# Patient Record
Sex: FEMALE
[De-identification: names, ages, dates, MRNs, and addresses within clinical notes are randomized per-mention and may not be internally consistent; named-entity substitution may affect disease eponyms.]

---

## 2020-09-08 PROBLEM — Z00.00 ENCOUNTER FOR PREVENTIVE HEALTH EXAMINATION: Status: ACTIVE | Noted: 2020-09-08

## 2020-09-09 ENCOUNTER — APPOINTMENT (OUTPATIENT)
Dept: ORTHOPEDIC SURGERY | Facility: CLINIC | Age: 65
End: 2020-09-09
Payer: MEDICARE

## 2020-09-09 VITALS
SYSTOLIC BLOOD PRESSURE: 127 MMHG | BODY MASS INDEX: 21.26 KG/M2 | HEIGHT: 63 IN | WEIGHT: 120 LBS | DIASTOLIC BLOOD PRESSURE: 82 MMHG

## 2020-09-09 VITALS — TEMPERATURE: 97.3 F

## 2020-09-09 DIAGNOSIS — Q74.0 OTHER CONGENITAL MALFORMATIONS OF UPPER LIMB(S), INCLUDING SHOULDER GIRDLE: ICD-10-CM

## 2020-09-09 DIAGNOSIS — M25.332 OTHER INSTABILITY, LEFT WRIST: ICD-10-CM

## 2020-09-09 DIAGNOSIS — M25.531 PAIN IN RIGHT WRIST: ICD-10-CM

## 2020-09-09 DIAGNOSIS — Z78.9 OTHER SPECIFIED HEALTH STATUS: ICD-10-CM

## 2020-09-09 DIAGNOSIS — M25.532 PAIN IN LEFT WRIST: ICD-10-CM

## 2020-09-09 DIAGNOSIS — M19.131 POST-TRAUMATIC OSTEOARTHRITIS, RIGHT WRIST: ICD-10-CM

## 2020-09-09 DIAGNOSIS — J30.2 OTHER SEASONAL ALLERGIC RHINITIS: ICD-10-CM

## 2020-09-09 PROCEDURE — 73110 X-RAY EXAM OF WRIST: CPT | Mod: RT

## 2020-09-09 PROCEDURE — 99203 OFFICE O/P NEW LOW 30 MIN: CPT

## 2020-09-12 PROBLEM — Z78.9 CURRENT NON-SMOKER: Status: ACTIVE | Noted: 2020-09-12

## 2020-09-12 PROBLEM — Z78.9 CONSUMES ALCOHOL OCCASIONALLY: Status: ACTIVE | Noted: 2020-09-12

## 2020-09-12 RX ORDER — ALPRAZOLAM 0.5 MG/1
0.5 TABLET ORAL
Qty: 120 | Refills: 0 | Status: ACTIVE | COMMUNITY
Start: 2020-04-28

## 2024-01-01 ENCOUNTER — EMERGENCY (EMERGENCY)
Facility: HOSPITAL | Age: 69
LOS: 1 days | End: 2024-01-01
Attending: STUDENT IN AN ORGANIZED HEALTH CARE EDUCATION/TRAINING PROGRAM | Admitting: EMERGENCY MEDICINE
Payer: MEDICARE

## 2024-01-01 VITALS
SYSTOLIC BLOOD PRESSURE: 135 MMHG | DIASTOLIC BLOOD PRESSURE: 79 MMHG | OXYGEN SATURATION: 98 % | TEMPERATURE: 98 F | RESPIRATION RATE: 16 BRPM | HEART RATE: 67 BPM

## 2024-01-01 VITALS
OXYGEN SATURATION: 95 % | WEIGHT: 119.93 LBS | RESPIRATION RATE: 18 BRPM | HEART RATE: 102 BPM | SYSTOLIC BLOOD PRESSURE: 173 MMHG | TEMPERATURE: 98 F | HEIGHT: 63 IN | DIASTOLIC BLOOD PRESSURE: 95 MMHG

## 2024-01-01 DIAGNOSIS — I67.1 CEREBRAL ANEURYSM, NONRUPTURED: ICD-10-CM

## 2024-01-01 DIAGNOSIS — Z90.13 ACQUIRED ABSENCE OF BILATERAL BREASTS AND NIPPLES: Chronic | ICD-10-CM

## 2024-01-01 LAB
ADD ON TEST-SPECIMEN IN LAB: SIGNIFICANT CHANGE UP
ALBUMIN SERPL ELPH-MCNC: 4.1 G/DL — SIGNIFICANT CHANGE UP (ref 3.3–5)
ALBUMIN SERPL ELPH-MCNC: 4.4 G/DL — SIGNIFICANT CHANGE UP (ref 3.3–5)
ALP SERPL-CCNC: 102 U/L — SIGNIFICANT CHANGE UP (ref 40–120)
ALP SERPL-CCNC: 110 U/L — SIGNIFICANT CHANGE UP (ref 40–120)
ALT FLD-CCNC: 25 U/L — SIGNIFICANT CHANGE UP (ref 4–33)
ALT FLD-CCNC: 27 U/L — SIGNIFICANT CHANGE UP (ref 4–33)
ANION GAP SERPL CALC-SCNC: 15 MMOL/L — HIGH (ref 7–14)
ANION GAP SERPL CALC-SCNC: 17 MMOL/L — HIGH (ref 7–14)
APTT BLD: 28.7 SEC — SIGNIFICANT CHANGE UP (ref 24.5–35.6)
AST SERPL-CCNC: 15 U/L — SIGNIFICANT CHANGE UP (ref 4–32)
AST SERPL-CCNC: 21 U/L — SIGNIFICANT CHANGE UP (ref 4–32)
BASOPHILS # BLD AUTO: 0.02 K/UL — SIGNIFICANT CHANGE UP (ref 0–0.2)
BASOPHILS # BLD AUTO: 0.03 K/UL — SIGNIFICANT CHANGE UP (ref 0–0.2)
BASOPHILS NFR BLD AUTO: 0.2 % — SIGNIFICANT CHANGE UP (ref 0–2)
BASOPHILS NFR BLD AUTO: 0.3 % — SIGNIFICANT CHANGE UP (ref 0–2)
BILIRUB SERPL-MCNC: 0.6 MG/DL — SIGNIFICANT CHANGE UP (ref 0.2–1.2)
BILIRUB SERPL-MCNC: 0.7 MG/DL — SIGNIFICANT CHANGE UP (ref 0.2–1.2)
BLD GP AB SCN SERPL QL: NEGATIVE — SIGNIFICANT CHANGE UP
BUN SERPL-MCNC: 11 MG/DL — SIGNIFICANT CHANGE UP (ref 7–23)
BUN SERPL-MCNC: 13 MG/DL — SIGNIFICANT CHANGE UP (ref 7–23)
CALCIUM SERPL-MCNC: 9.1 MG/DL — SIGNIFICANT CHANGE UP (ref 8.4–10.5)
CALCIUM SERPL-MCNC: 9.7 MG/DL — SIGNIFICANT CHANGE UP (ref 8.4–10.5)
CHLORIDE SERPL-SCNC: 102 MMOL/L — SIGNIFICANT CHANGE UP (ref 98–107)
CHLORIDE SERPL-SCNC: 105 MMOL/L — SIGNIFICANT CHANGE UP (ref 98–107)
CO2 SERPL-SCNC: 20 MMOL/L — LOW (ref 22–31)
CO2 SERPL-SCNC: 23 MMOL/L — SIGNIFICANT CHANGE UP (ref 22–31)
CREAT SERPL-MCNC: 0.5 MG/DL — SIGNIFICANT CHANGE UP (ref 0.5–1.3)
CREAT SERPL-MCNC: 0.57 MG/DL — SIGNIFICANT CHANGE UP (ref 0.5–1.3)
EGFR: 101 ML/MIN/1.73M2 — SIGNIFICANT CHANGE UP
EGFR: 98 ML/MIN/1.73M2 — SIGNIFICANT CHANGE UP
EOSINOPHIL # BLD AUTO: 0.01 K/UL — SIGNIFICANT CHANGE UP (ref 0–0.5)
EOSINOPHIL # BLD AUTO: 0.03 K/UL — SIGNIFICANT CHANGE UP (ref 0–0.5)
EOSINOPHIL NFR BLD AUTO: 0.1 % — SIGNIFICANT CHANGE UP (ref 0–6)
EOSINOPHIL NFR BLD AUTO: 0.3 % — SIGNIFICANT CHANGE UP (ref 0–6)
FLUAV AG NPH QL: SIGNIFICANT CHANGE UP
FLUBV AG NPH QL: SIGNIFICANT CHANGE UP
GLUCOSE SERPL-MCNC: 107 MG/DL — HIGH (ref 70–99)
GLUCOSE SERPL-MCNC: 198 MG/DL — HIGH (ref 70–99)
GRAM STN FLD: SIGNIFICANT CHANGE UP
HCT VFR BLD CALC: 41.5 % — SIGNIFICANT CHANGE UP (ref 34.5–45)
HCT VFR BLD CALC: 44.2 % — SIGNIFICANT CHANGE UP (ref 34.5–45)
HGB BLD-MCNC: 13.8 G/DL — SIGNIFICANT CHANGE UP (ref 11.5–15.5)
HGB BLD-MCNC: 14 G/DL — SIGNIFICANT CHANGE UP (ref 11.5–15.5)
IANC: 7.07 K/UL — SIGNIFICANT CHANGE UP (ref 1.8–7.4)
IANC: 9.33 K/UL — HIGH (ref 1.8–7.4)
IMM GRANULOCYTES NFR BLD AUTO: 0.3 % — SIGNIFICANT CHANGE UP (ref 0–0.9)
IMM GRANULOCYTES NFR BLD AUTO: 0.6 % — SIGNIFICANT CHANGE UP (ref 0–0.9)
INR BLD: 1.03 RATIO — SIGNIFICANT CHANGE UP (ref 0.85–1.16)
LIDOCAIN IGE QN: 20 U/L — SIGNIFICANT CHANGE UP (ref 7–60)
LYMPHOCYTES # BLD AUTO: 0.64 K/UL — LOW (ref 1–3.3)
LYMPHOCYTES # BLD AUTO: 1.56 K/UL — SIGNIFICANT CHANGE UP (ref 1–3.3)
LYMPHOCYTES # BLD AUTO: 16.3 % — SIGNIFICANT CHANGE UP (ref 13–44)
LYMPHOCYTES # BLD AUTO: 6.3 % — LOW (ref 13–44)
MAGNESIUM SERPL-MCNC: 2.1 MG/DL — SIGNIFICANT CHANGE UP (ref 1.6–2.6)
MCHC RBC-ENTMCNC: 30.4 PG — SIGNIFICANT CHANGE UP (ref 27–34)
MCHC RBC-ENTMCNC: 31.2 PG — SIGNIFICANT CHANGE UP (ref 27–34)
MCHC RBC-ENTMCNC: 31.7 G/DL — LOW (ref 32–36)
MCHC RBC-ENTMCNC: 33.3 G/DL — SIGNIFICANT CHANGE UP (ref 32–36)
MCV RBC AUTO: 93.9 FL — SIGNIFICANT CHANGE UP (ref 80–100)
MCV RBC AUTO: 95.9 FL — SIGNIFICANT CHANGE UP (ref 80–100)
MONOCYTES # BLD AUTO: 0.08 K/UL — SIGNIFICANT CHANGE UP (ref 0–0.9)
MONOCYTES # BLD AUTO: 0.85 K/UL — SIGNIFICANT CHANGE UP (ref 0–0.9)
MONOCYTES NFR BLD AUTO: 0.8 % — LOW (ref 2–14)
MONOCYTES NFR BLD AUTO: 8.9 % — SIGNIFICANT CHANGE UP (ref 2–14)
NEUTROPHILS # BLD AUTO: 7.07 K/UL — SIGNIFICANT CHANGE UP (ref 1.8–7.4)
NEUTROPHILS # BLD AUTO: 9.33 K/UL — HIGH (ref 1.8–7.4)
NEUTROPHILS NFR BLD AUTO: 73.9 % — SIGNIFICANT CHANGE UP (ref 43–77)
NEUTROPHILS NFR BLD AUTO: 92 % — HIGH (ref 43–77)
NRBC # BLD: 0 /100 WBCS — SIGNIFICANT CHANGE UP (ref 0–0)
NRBC # BLD: 0 /100 WBCS — SIGNIFICANT CHANGE UP (ref 0–0)
NRBC # FLD: 0 K/UL — SIGNIFICANT CHANGE UP (ref 0–0)
NRBC # FLD: 0 K/UL — SIGNIFICANT CHANGE UP (ref 0–0)
PHOSPHATE SERPL-MCNC: 2.6 MG/DL — SIGNIFICANT CHANGE UP (ref 2.5–4.5)
PLATELET # BLD AUTO: 322 K/UL — SIGNIFICANT CHANGE UP (ref 150–400)
PLATELET # BLD AUTO: 329 K/UL — SIGNIFICANT CHANGE UP (ref 150–400)
POTASSIUM SERPL-MCNC: 3.5 MMOL/L — SIGNIFICANT CHANGE UP (ref 3.5–5.3)
POTASSIUM SERPL-MCNC: 4.7 MMOL/L — SIGNIFICANT CHANGE UP (ref 3.5–5.3)
POTASSIUM SERPL-SCNC: 3.5 MMOL/L — SIGNIFICANT CHANGE UP (ref 3.5–5.3)
POTASSIUM SERPL-SCNC: 4.7 MMOL/L — SIGNIFICANT CHANGE UP (ref 3.5–5.3)
PROT SERPL-MCNC: 7.9 G/DL — SIGNIFICANT CHANGE UP (ref 6–8.3)
PROT SERPL-MCNC: 8.4 G/DL — HIGH (ref 6–8.3)
PROTHROM AB SERPL-ACNC: 12.3 SEC — SIGNIFICANT CHANGE UP (ref 9.9–13.4)
RBC # BLD: 4.42 M/UL — SIGNIFICANT CHANGE UP (ref 3.8–5.2)
RBC # BLD: 4.61 M/UL — SIGNIFICANT CHANGE UP (ref 3.8–5.2)
RBC # FLD: 12.7 % — SIGNIFICANT CHANGE UP (ref 10.3–14.5)
RBC # FLD: 12.9 % — SIGNIFICANT CHANGE UP (ref 10.3–14.5)
RH IG SCN BLD-IMP: POSITIVE — SIGNIFICANT CHANGE UP
RSV RNA NPH QL NAA+NON-PROBE: SIGNIFICANT CHANGE UP
SARS-COV-2 RNA SPEC QL NAA+PROBE: SIGNIFICANT CHANGE UP
SODIUM SERPL-SCNC: 139 MMOL/L — SIGNIFICANT CHANGE UP (ref 135–145)
SODIUM SERPL-SCNC: 143 MMOL/L — SIGNIFICANT CHANGE UP (ref 135–145)
SPECIMEN SOURCE: SIGNIFICANT CHANGE UP
WBC # BLD: 10.14 K/UL — SIGNIFICANT CHANGE UP (ref 3.8–10.5)
WBC # BLD: 9.57 K/UL — SIGNIFICANT CHANGE UP (ref 3.8–10.5)
WBC # FLD AUTO: 10.14 K/UL — SIGNIFICANT CHANGE UP (ref 3.8–10.5)
WBC # FLD AUTO: 9.57 K/UL — SIGNIFICANT CHANGE UP (ref 3.8–10.5)

## 2024-01-01 PROCEDURE — 93010 ELECTROCARDIOGRAM REPORT: CPT

## 2024-01-01 PROCEDURE — 31500 INSERT EMERGENCY AIRWAY: CPT

## 2024-01-01 PROCEDURE — 99223 1ST HOSP IP/OBS HIGH 75: CPT | Mod: 25

## 2024-01-01 PROCEDURE — 36410 VNPNXR 3YR/> PHY/QHP DX/THER: CPT | Mod: 59

## 2024-01-01 PROCEDURE — 76937 US GUIDE VASCULAR ACCESS: CPT | Mod: 26

## 2024-01-01 PROCEDURE — 42700 I&D ABSCESS PERITONSILLAR: CPT | Mod: LT

## 2024-01-01 PROCEDURE — 70491 CT SOFT TISSUE NECK W/DYE: CPT | Mod: 26,MC

## 2024-01-01 RX ORDER — FAMOTIDINE 20 MG/1
20 TABLET, FILM COATED ORAL ONCE
Refills: 0 | Status: COMPLETED | OUTPATIENT
Start: 2024-01-01 | End: 2024-01-01

## 2024-01-01 RX ORDER — DEXAMETHASONE 1.5 MG/1
10 TABLET ORAL ONCE
Refills: 0 | Status: DISCONTINUED | OUTPATIENT
Start: 2024-01-01 | End: 2024-01-01

## 2024-01-01 RX ORDER — 0.9 % SODIUM CHLORIDE 0.9 %
1000 INTRAVENOUS SOLUTION INTRAVENOUS ONCE
Refills: 0 | Status: COMPLETED | OUTPATIENT
Start: 2024-01-01 | End: 2024-01-01

## 2024-01-01 RX ORDER — MAGNESIUM, ALUMINUM HYDROXIDE 200-225/5
30 SUSPENSION, ORAL (FINAL DOSE FORM) ORAL ONCE
Refills: 0 | Status: COMPLETED | OUTPATIENT
Start: 2024-01-01 | End: 2024-01-01

## 2024-01-01 RX ORDER — AMPICILLIN AND SULBACTAM 1; .5 G/1; G/1
3 INJECTION, POWDER, FOR SOLUTION INTRAVENOUS EVERY 6 HOURS
Refills: 0 | Status: DISCONTINUED | OUTPATIENT
Start: 2024-01-01 | End: 2024-12-03

## 2024-01-01 RX ORDER — METOCLOPRAMIDE HYDROCHLORIDE 10 MG/1
10 TABLET ORAL ONCE
Refills: 0 | Status: COMPLETED | OUTPATIENT
Start: 2024-01-01 | End: 2024-01-01

## 2024-01-01 RX ORDER — DEXAMETHASONE 1.5 MG/1
6 TABLET ORAL EVERY 8 HOURS
Refills: 0 | Status: DISCONTINUED | OUTPATIENT
Start: 2024-01-01 | End: 2024-12-03

## 2024-01-01 RX ORDER — KETOROLAC TROMETHAMINE 30 MG/ML
15 INJECTION INTRAMUSCULAR; INTRAVENOUS ONCE
Refills: 0 | Status: DISCONTINUED | OUTPATIENT
Start: 2024-01-01 | End: 2024-01-01

## 2024-01-01 RX ORDER — BENZOCAINE 10 %
1 OINTMENT (GRAM) TOPICAL ONCE
Refills: 0 | Status: COMPLETED | OUTPATIENT
Start: 2024-01-01 | End: 2024-01-01

## 2024-01-01 RX ORDER — ACETAMINOPHEN 500MG 500 MG/1
650 TABLET, COATED ORAL ONCE
Refills: 0 | Status: COMPLETED | OUTPATIENT
Start: 2024-01-01 | End: 2024-01-01

## 2024-01-01 RX ORDER — LIDOCAINE HCL 20 MG/ML
10 VIAL (ML) INJECTION ONCE
Refills: 0 | Status: COMPLETED | OUTPATIENT
Start: 2024-01-01 | End: 2024-01-01

## 2024-01-01 RX ORDER — SODIUM CHLORIDE 9 MG/ML
1000 INJECTION, SOLUTION INTRAMUSCULAR; INTRAVENOUS; SUBCUTANEOUS
Refills: 0 | Status: DISCONTINUED | OUTPATIENT
Start: 2024-01-01 | End: 2024-12-03

## 2024-01-01 RX ORDER — SUCRALFATE 1 G/1
1 TABLET ORAL ONCE
Refills: 0 | Status: COMPLETED | OUTPATIENT
Start: 2024-01-01 | End: 2024-01-01

## 2024-01-01 RX ORDER — ONDANSETRON HYDROCHLORIDE 4 MG/1
4 TABLET, FILM COATED ORAL ONCE
Refills: 0 | Status: COMPLETED | OUTPATIENT
Start: 2024-01-01 | End: 2024-01-01

## 2024-01-01 RX ORDER — LIDOCAINE HCL 20 MG/ML
4 VIAL (ML) INJECTION ONCE
Refills: 0 | Status: COMPLETED | OUTPATIENT
Start: 2024-01-01 | End: 2024-01-01

## 2024-01-01 RX ADMIN — SUCRALFATE 1 GRAM(S): 1 TABLET ORAL at 23:43

## 2024-01-01 RX ADMIN — DEXAMETHASONE 6 MILLIGRAM(S): 1.5 TABLET ORAL at 06:05

## 2024-01-01 RX ADMIN — ONDANSETRON HYDROCHLORIDE 4 MILLIGRAM(S): 4 TABLET, FILM COATED ORAL at 00:52

## 2024-01-01 RX ADMIN — METOCLOPRAMIDE HYDROCHLORIDE 10 MILLIGRAM(S): 10 TABLET ORAL at 01:22

## 2024-01-01 RX ADMIN — FAMOTIDINE 20 MILLIGRAM(S): 20 TABLET, FILM COATED ORAL at 21:39

## 2024-01-01 RX ADMIN — Medication 1 SPRAY(S): at 18:35

## 2024-01-01 RX ADMIN — Medication 10 MILLILITER(S): at 18:35

## 2024-01-01 RX ADMIN — Medication 1000 MILLILITER(S): at 12:28

## 2024-01-01 RX ADMIN — Medication 30 MILLILITER(S): at 23:43

## 2024-01-01 RX ADMIN — AMPICILLIN AND SULBACTAM 200 GRAM(S): 1; .5 INJECTION, POWDER, FOR SOLUTION INTRAVENOUS at 06:05

## 2024-01-01 RX ADMIN — ACETAMINOPHEN 500MG 650 MILLIGRAM(S): 500 TABLET, COATED ORAL at 23:43

## 2024-01-01 RX ADMIN — AMPICILLIN AND SULBACTAM 200 GRAM(S): 1; .5 INJECTION, POWDER, FOR SOLUTION INTRAVENOUS at 23:46

## 2024-01-01 RX ADMIN — Medication 4 MILLILITER(S): at 17:58

## 2024-01-01 RX ADMIN — DEXAMETHASONE 6 MILLIGRAM(S): 1.5 TABLET ORAL at 19:53

## 2024-01-01 RX ADMIN — AMPICILLIN AND SULBACTAM 3 GRAM(S): 1; .5 INJECTION, POWDER, FOR SOLUTION INTRAVENOUS at 18:35

## 2024-01-01 RX ADMIN — AMPICILLIN AND SULBACTAM 200 GRAM(S): 1; .5 INJECTION, POWDER, FOR SOLUTION INTRAVENOUS at 17:56

## 2024-01-01 RX ADMIN — AMPICILLIN AND SULBACTAM 200 GRAM(S): 1; .5 INJECTION, POWDER, FOR SOLUTION INTRAVENOUS at 12:28

## 2024-01-01 RX ADMIN — KETOROLAC TROMETHAMINE 15 MILLIGRAM(S): 30 INJECTION INTRAMUSCULAR; INTRAVENOUS at 12:28

## 2024-01-01 RX ADMIN — SODIUM CHLORIDE 200 MILLILITER(S): 9 INJECTION, SOLUTION INTRAMUSCULAR; INTRAVENOUS; SUBCUTANEOUS at 03:04

## 2024-11-30 NOTE — ED PROVIDER NOTE - ATTENDING CONTRIBUTION TO CARE
I have discussed the patient's case presentation with resident. I have also personally performed a face-to-face evaluation of the patient. I agree with the resident's assessment and plan.    Patient's physical exam is concerning for left PTA. She is speaking full clear sentences, no airway compromise, tolerating her own secretions, in no distress at this time. Will pursue CT neck w/ IV contrast to eval for PTA vs RPA vs mass. If PTA, will attempt needle aspiration in ED and place in CDU for IV antibiotics and monitoring. (She previously failed amoxicillin at home, hence escalation of abx treatment and monitoring prior to discharge)

## 2024-11-30 NOTE — ED ADULT TRIAGE NOTE - CHIEF COMPLAINT QUOTE
Pt. c/o left sided throat pain and swelling since Wednesday. No improvement with antibiotic prescribed by UC. Having difficulty swallowing. No drooling or respiratory distress noted. Denies cough, congestion or fever.

## 2024-11-30 NOTE — ED CDU PROVIDER INITIAL DAY NOTE - ATTENDING APP SHARED VISIT CONTRIBUTION OF CARE
See my ED provider note    S/p needle aspiration I&D of left PTA, aspirated 2.2ml melquiades pus w/ some blood. Pt reports feeling much improved after aspiration. Continue Unasyn IV, monitor for worsening of symptoms.

## 2024-11-30 NOTE — ED PROVIDER NOTE - CLINICAL SUMMARY MEDICAL DECISION MAKING FREE TEXT BOX
EM PGY-2/Arthur DO: 69-year-old female with no reported PMHx, PSHx bilateral mastectomy with reconstruction, NKDA presents to the ED for evaluation of 1 week onset worsening left-sided throat swelling.  Patient states she went to urgent care 3 days ago and was given a prescription for amoxicillin which she has been taking, however states it has been getting worse.  She denies any fevers, but endorses chills and voice changes.  She states that at night she will wake herself up feeling like her tongue and throat are closing her airway, but denies feeling like she is having difficulty breathing right now.  She denies any other associated nasal congestion, pain with extraocular movement, throat swelling SOB, chest pain, lightheadedness, dizziness.  Denies any known sick exposure or known inciting events.  Denies any history of prior.  Denies any history of head and neck surgery.    VS tachycardic, hypertensive, afebrile, no respiratory distress, on room air.  On exam patient is nontoxic, well-appearing, speaking in full sentences with raspy voice.  EOMI, conjunctiva/sclera clear, PERRLA, no nystagmus.  No palpable sinus tenderness.  Oropharynx erythematous with left tonsillar prominence and uvular deviation to the right.  Airway patent. Minimal palpable lymphadenopathy within the cervical region, neck with FROM. No anterior neck swelling.  Maintaining secretions.  Minimal palpable lymphadenopathy within the cervical region.  CV tachycardic, RRR, no MGR appreciated.  Lungs CTAB.  Moving all extremities, walking in room, no neurological deficits.  No visible bruising or rashes.  Mood and affect congruent.    DDx high suspicion for PTA.  Will obtain CT of the neck and cover with Unasyn.  Also swab for viruses and obtain blood work.  Will likely require I&D versus CDU for IV antibiotics.

## 2024-11-30 NOTE — ED PROVIDER NOTE - PHYSICAL EXAMINATION
patient is nontoxic, well-appearing, speaking in full sentences with raspy voice.    EOMI, conjunctiva/sclera clear, PERRLA, no nystagmus.  No palpable sinus tenderness.  Oropharynx erythematous with left tonsillar prominence and uvular deviation to the right, no exudates appreciated.  Airway patent.  No anterior neck swelling.  Maintaining secretions.    Minimal palpable lymphadenopathy within the cervical region, neck with FROM. No anterior neck swelling.  CV tachycardic, RRR, no MGR appreciated.    Lungs CTAB.    Moving all extremities, walking in room, no neurological deficits.    No visible bruising or rashes.    Mood and affect congruent.

## 2024-11-30 NOTE — ED PROVIDER NOTE - PROGRESS NOTE DETAILS
EM PGY-2/DO Arthur: Received call from radiology informing of L PTA. Will plan for I&D. EM PGY-2/Arthur DO: Pt to go to CDU for 24 hr unasyn. Decadron and pepcid ordered post procedure. Consulted vascular for incidental 7mm carotid aneursym EM PGY-2/Arthur DO: Pt to go to CDU for 24 hr unasyn. Decadron and pepcid ordered post procedure. Consulted vascular for incidental 7mm carotid aneurysm.

## 2024-11-30 NOTE — ED ADULT NURSE NOTE - OBJECTIVE STATEMENT
pt c/o throat pain on one side/  pt in resp distress . iv placed in rt lower arm fluids hung/ labs sent off/ and meds given/

## 2024-11-30 NOTE — ED CDU PROVIDER INITIAL DAY NOTE - OBJECTIVE STATEMENT
70 y/o F with pmhx of GERD who presents to the ED c/o sore throat, mainly left sided x 1 week. Pt states that she swallowed her food a week ago and felt something sharp against the left side of her throat which precipitated pain. Pt went to urgent care, was given amoxicillin without relief of sx. Denies any other associated nasal congestion, pain with extraocular movement, throat swelling SOB, chest pain, lightheadedness, dizziness.  Denies any known sick exposure or known inciting events.  Denies any history of prior.  Denies any history of head and neck surgery.   In ED, no leukocytosis (WBC 10.14), all other labs within normal limits. CT with "1.  Findings compatible with bilateral palatine tonsillitis with a left   peritonsillar abscess as detailed above. There is reactive cervical   lymphadenopathy.  2.  There is a 2 mm posteriorly projecting aneurysm about the left M1   segment. Fusiform aneurysmal dilation of the right carotid terminus   measuring up to 7 mm." PTA I&D by ED team. Pt placed in CDU for IV abx and decadron. Neurosurg also consulted for incidental finding of two aneurysms as noted above.

## 2024-11-30 NOTE — ED PROCEDURE NOTE - CPROC ED LOCAL ANESTHESIA1
1% lidocaine/topical anesthetic lidocaine 4% nebulized; Hurricane spray/1% lidocaine/topical anesthetic

## 2024-12-01 NOTE — CONSULT NOTE ADULT - SUBJECTIVE AND OBJECTIVE BOX
NEUROSURGERY CONSULT    HPI: 69-year-old female with no reported PMHx, PSHx bilateral mastectomy with reconstruction, NKDA presents to the ED for evaluation of 1 week onset worsening left-sided throat swelling.  Patient states she went to urgent care 3 days ago and was given a prescription for amoxicillin which she has been taking, however states it has been getting worse.  She denies any fevers, but endorses chills and voice changes.  She states that at night she will wake herself up feeling like her tongue and throat are closing her airway, but denies feeling like she is having difficulty breathing right now.  She denies any other associated nasal congestion, pain with extraocular movement, throat swelling SOB, chest pain, lightheadedness, dizziness.  Denies any known sick exposure or known inciting events.  Denies any history of prior.  Denies any history of head and neck surgery.      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     RADIOLOGY:   < from: CT Neck Soft Tissue w/ IV Cont (11.30.24 @ 15:28) >  IMPRESSION:  1.  Findings compatible with bilateral palatine tonsillitis with a left   peritonsillar abscess as detailed above. There is reactive cervical   lymphadenopathy.  2.  There is a 2 mm posteriorly projecting aneurysm about the left M1   segment. Fusiform aneurysmal dilation of the right carotid terminus   measuring up to 7 mm.      Findings were discussed by Dr. Rendon with Dr. Gibson on 11/30/2024 3:51   PM withread back confirmation.    < end of copied text >    MEDS:  ampicillin/sulbactam  IVPB 3 Gram(s) IV Intermittent every 6 hours  dexAMETHasone  Injectable 6 milliGRAM(s) IV Push every 8 hours  sodium chloride 0.9%. 1000 milliLiter(s) IV Continuous <Continuous>      Vital Signs Last 24 Hrs  T(C): 36.7 (01 Dec 2024 02:34), Max: 37.6 (30 Nov 2024 22:53)  T(F): 98 (01 Dec 2024 02:34), Max: 99.6 (30 Nov 2024 22:53)  HR: 67 (01 Dec 2024 02:34) (67 - 102)  BP: 133/75 (01 Dec 2024 02:34) (133/75 - 173/95)  BP(mean): --  RR: 18 (01 Dec 2024 02:34) (18 - 19)  SpO2: 98% (01 Dec 2024 02:34) (95% - 100%)    Parameters below as of 01 Dec 2024 02:34  Patient On (Oxygen Delivery Method): room air        LABS:                        13.8   10.14 )-----------( 322      ( 01 Dec 2024 02:58 )             41.5     12-01    139  |  102  |  11  ----------------------------<  198[H]  3.5   |  20[L]  |  0.50    Ca    9.1      01 Dec 2024 02:58  Phos  2.6     12-01  Mg     2.10     12-01    TPro  7.9  /  Alb  4.1  /  TBili  0.6  /  DBili  x   /  AST  15  /  ALT  25  /  AlkPhos  102  12-01    PT/INR - ( 30 Nov 2024 12:29 )   PT: 12.3 sec;   INR: 1.03 ratio         PTT - ( 30 Nov 2024 12:29 )  PTT:28.7 sec      PHYSICAL EXAM:  AOx3, appropriate, follows commands  PERRL, EOMI, face symmetrical   THOMAS 5/5  No pronator drift

## 2024-12-01 NOTE — ED CDU PROVIDER SUBSEQUENT DAY NOTE - ATTENDING APP SHARED VISIT CONTRIBUTION OF CARE
Arrived to shift and right after sign out, nurse called team to bedside calling for help. She reported patient had been doing ok this am, had been eating breakfast, few mins later she was witnessed to turn pale, become unresponsive, and have agonal breathing. On arrival to bedside, patient with agonal breathing, unreponsive and pulseless. Compressions initiated, Resources immediately called to bedside, ACLS started utilizing Shailesh and BVM, zoll rhythm showing PEA, definitive airway obtained with first pass success using glidescope, 7.5mm ett placed 21 at the lip-b/l bs auscultated and confirmed by colorimeter, bagging without any resistance. Pulse checks were performed both by palpation of pulse and by sono of heart, Initial rhythm PEA, meds given as per code sheet, after several rounds rhythm identified more c/w vf x2, shocked both times with compressions immediately resumed, then became pea again. Despite continuation of acls, further rhythm checks with asystole. Code team det further attempts futile given asystole and attempts thus far,  was at bedside and spoken with by Dr. Corrigan, informed of situation. TOD was called at 8:26am with asystole as final rhythm. Respiratory, MICU, US team, Nursing, EDTs, PA and MD/DO teams assisting throughout. ME called and accepting case as per documentation in dispo note. SW called to assist with support for family. Sister and mother brought in as well with  to be at pt bedside. Arrived to shift and right after sign out, nurse called team to bedside calling for help. She reported patient had been doing ok this am, had been eating breakfast, few mins later she was witnessed to turn pale, become unresponsive, and have agonal breathing. On arrival to bedside, patient with agonal breathing, unreponsive and pulseless. Compressions initiated, Resources immediately called to bedside, ACLS started utilizing Shailesh and BVM, zoll rhythm showing PEA, definitive airway obtained with first pass success using glidescope, 7.5mm ett placed 21 at the lip-b/l bs auscultated and confirmed by colorimeter, bagging without any resistance. Pulse checks were performed both by palpation of pulse and by sono of heart, Initial rhythm PEA, meds given as per code sheet, after several rounds rhythm identified more c/w vf x2, shocked both times with compressions immediately resumed, then became pea again. Despite continuation of acls, further rhythm checks with asystole. Code team det further attempts futile given asystole and attempts thus far,  was at bedside and spoken with by Dr. Corrigan, informed of situation. TOD was called at 8:26am with asystole as final rhythm. Respiratory, MICU, US team, Nursing, EDTs, PA and MD/DO teams assisting throughout. ME called and accepting case as per documentation in dispo note. SW called to assist with support for family. Sister and mother brought in as well with  to be at pt bedside.  patients exam  GEN - unresponsive  EYES- pupils fixed  ENT: Airway patent, no visualized obstruction, no fb, well healing posterior oropharynx, no bleeding   NECK: Neck supple, no swelling, no mass, no pulse  PULMONARY - BS auscultated in both lungs with masking, no spontaneous breath sounds, no wheezing/rales  CARDIAC -no spontaneous cardiac activity  ABDOMEN - soft, nondistended, no mass  EXTREMITIES - no edema, no rash, no deformity, pulses palpated with compressions (no pulse without)  SKIN - pale, no rash  NEUROLOGIC - unresponsive, face symmetric Arrived to shift and right after sign out, nurse called team to bedside calling for help. She reported patient had been doing ok this am, had been eating breakfast, few mins later she was witnessed to turn pale, become unresponsive, and have agonal breathing. On arrival to bedside, patient with agonal breathing, unreponsive and pulseless. Compressions initiated, Resources immediately called to bedside, ACLS started utilizing Shailesh and BVM, zoll rhythm showing PEA, definitive airway obtained with first pass success using glidescope, 7.5mm ett placed 21 at the lip-b/l bs auscultated and confirmed by colorimeter, bagging without any resistance. Pulse checks were performed both by palpation of pulse and by sono of heart, Initial rhythm PEA, meds given as per code sheet, after several rounds rhythm identified more c/w vf x2, shocked both times with compressions immediately resumed, then became pea again. Despite continuation of acls, further rhythm checks with asystole. Code team det further attempts futile given asystole and attempts thus far,  was at bedside and spoken with by Dr. Corrigan, informed of situation. TOD was called at 8:26am with asystole as final rhythm. Respiratory, MICU, US team, Nursing, EDTs, PA and MD/DO teams assisting throughout. ME called and accepting case as per documentation in dispo note. SW called to assist with support for family. Sister and mother brought in as well with  to be at pt bedside.  patients exam  GEN - unresponsive  EYES- pupils fixed  ENT: Airway patent, no visualized obstruction, no fb, well healing posterior oropharynx, mild tonsillar enlargement, no bleeding   NECK: Neck supple, no swelling, no mass, no pulse  PULMONARY - BS auscultated in both lungs with masking, no spontaneous breath sounds, no wheezing/rales  CARDIAC -no spontaneous cardiac activity  ABDOMEN - soft, nondistended, no mass  EXTREMITIES - no edema, no rash, no deformity, pulses palpated with compressions (no pulse without)  SKIN - pale, no rash  NEUROLOGIC - unresponsive, face symmetric Arrived to shift and right after sign out, nurse called team to bedside calling for help. She reported patient had been doing ok this am, had been eating breakfast, few mins later she was witnessed to turn pale, become unresponsive, and have agonal breathing. On arrival to bedside, patient with agonal breathing, unreponsive and pulseless. Compressions initiated, Resources immediately called to bedside, ACLS started utilizing Shailesh and BVM, zoll rhythm showing PEA, definitive airway obtained with first pass success using glidescope, 7.5mm ett placed 21 at the lip-b/l bs auscultated and confirmed by colorimeter, bagging without any resistance. Pulse checks were performed both by palpation of pulse and by sono of heart, Initial rhythm PEA, meds given as per code sheet, after several rounds rhythm identified more c/w vf x2, shocked both times with compressions immediately resumed, then became pea again. Despite continuation of acls, further rhythm checks with asystole. Code team det further attempts futile given asystole and attempts thus far,  was at bedside and spoken with by Dr. Corrigan, informed of situation. TOD was called at 8:26am with asystole as final rhythm. Respiratory, MICU, US team, Nursing, EDTs, PA and MD/DO teams assisting throughout. ME called and declined case as per documentation in dispo note. SW called to assist with support for family. Sister and mother brought in as well with  to be at pt bedside.  declined autopsy  patients exam  GEN - unresponsive  EYES- pupils fixed  ENT: Airway patent, no visualized obstruction, no fb, well healing posterior oropharynx, mild tonsillar enlargement, no bleeding   NECK: Neck supple, no swelling, no mass, no pulse  PULMONARY - BS auscultated in both lungs with masking, no spontaneous breath sounds, no wheezing/rales  CARDIAC -no spontaneous cardiac activity  ABDOMEN - soft, nondistended, no mass  EXTREMITIES - no edema, no rash, no deformity, pulses palpated with compressions (no pulse without)  SKIN - pale, no rash  NEUROLOGIC - unresponsive, face symmetric Arrived to shift and right after sign out, nurse called team to bedside calling for help. She reported patient had been doing ok this am, had eaten breakfast and tolerated well, then c/o transient abd pain to her which lasted a few seconds and fully resolved, few mins later she was witnessed to turn pale, become unresponsive, and have agonal breathing. On arrival to bedside, patient with agonal breathing, unresponsive and pulseless. Compressions initiated, Resources immediately called to bedside, ACLS started utilizing Shailesh and BVM, zoll rhythm showing PEA, definitive airway ett obtained with first pass success using glidescope, no fb noted in airway, 7.5mm ett placed 21 at the lip-b/l bs auscultated and confirmed by colorimeter, bagging without any resistance. Pulse checks were performed both by palpation of pulse and by sono of heart, Initial rhythm PEA, meds given as per code sheet, after several rounds rhythm identified more c/w vf x2, shocked both times with compressions immediately resumed, then became pea again. Despite continuation of acls, further rhythm checks with asystole. Code team det further attempts futile given asystole failure to respond to ACLS measures,  was at bedside and spoken with by Dr. Corrigan who was present during code, informed of situation. TOD was called at 8:26am with asystole as final rhythm. Respiratory, MICU, US team, Nursing, EDTs, PA and MD/DO teams assisting throughout. ME called and declined case as per documentation in dispo note. SW called to assist with support for family. Sister and mother brought in as well with  to be at pt bedside.  declined autopsy  patients exam  GEN - unresponsive  EYES- pupils fixed  ENT: Airway patent, no visualized obstruction, no fb, well healing posterior oropharynx, mild tonsillar enlargement, no bleeding   NECK: Neck supple, no swelling, no mass, no pulse  PULMONARY - BS auscultated in both lungs with masking, no spontaneous breath sounds, no wheezing/rales  CARDIAC -no spontaneous cardiac activity  ABDOMEN - soft, nondistended, no mass  EXTREMITIES - no edema, no rash, no deformity, pulses palpated with compressions (no pulse without)  SKIN - pale, no rash  NEUROLOGIC - unresponsive, face symmetric

## 2024-12-01 NOTE — CONSULT NOTE ADULT - ASSESSMENT
PLAN:  - No acute surgical intervention  -       TO be discussed with the fellow      Alley Jeffries PGY3  Vascular surgery  [id number]   PLAN:  - No acute surgical intervention  - F/u as outpatient with Dr Monson for surveillance of this incidental finding.       TO be discussed with the fellow      Alley Jeffries PGY3  Vascular surgery  [id number]

## 2024-12-01 NOTE — ED CDU PROVIDER DISPOSITION NOTE - CLINICAL COURSE
68 y/o F with pmhx of GERD who presents to the ED c/o sore throat, mainly left sided x 1 week. ?inciting food ingestion injury. went to urgent care started on amoxicillin, no improvement and came to ED. while in ED, CT performed revealing " Findings compatible with bilateral palatine tonsillitis with a left peritonsillar abscess as detailed above. There is reactive cervical lymphadenopathy.2 mm posteriorly projecting aneurysm about the left M1 segment. Fusiform aneurysmal dilation of the right carotid terminus measuring up to 7 mm." PTA I&D by ED team. Pt placed in CDU for IV abx and decadron. Neurosurg also consulted for incidental finding of two aneurysms as noted above, with no acute intervention necessitated at this time. Pt feeling well, tolerating PO, had some intermittent epigastric pain/reflux  and some intermittent vomiting which had improved with zofran/reglan. As per RN Gillian, states she had fed patient breakfast this AM, had a transient episode of abdominal pain that resolved and when nurse had checked on her, was found to be agonal breathing, pulseless, code blue initiated by CDU/ED team. CPR initiated right away, airway secured with tracheal intubation, initially PEA, then epi initiated, and after several pulses checks/PEA, ?Vfib seen on zole monitor, in which pt was shocked twice, no shockable rhythm seen and after multiple rounds of medications/efforts performed, TOD was called at 8:26AM. Case was discussed with  at bedside, empathy provided. Medical examiner contacted case accepted. Case # [id number]. 70 y/o F with pmhx of GERD who presents to the ED c/o sore throat, mainly left sided x 1 week. ?inciting food ingestion injury. went to urgent care started on amoxicillin, no improvement and came to ED. while in ED, CT performed revealing " Findings compatible with bilateral palatine tonsillitis with a left peritonsillar abscess as detailed above. There is reactive cervical lymphadenopathy. 2 mm posteriorly projecting aneurysm about the left M1 segment. Fusiform aneurysmal dilation of the right carotid terminus measuring up to 7 mm." PTA I&D by ED team. Pt placed in CDU for IV abx and decadron. Neurosurg also consulted for incidental finding of two aneurysms as noted above, with no acute intervention necessitated at this time. Pt feeling well, tolerating PO, had some intermittent epigastric pain/reflux  and some intermittent vomiting which had improved with zofran/reglan. As per RN Gillian, states she had fed patient breakfast this AM, had a transient episode of abdominal pain that resolved and when nurse had checked on her, was found to be agonal breathing, pulseless, code blue initiated by CDU/ED team. CPR initiated right away, airway secured with tracheal intubation, initially PEA, then epi initiated, and after several pulses checks/PEA, ?Vfib seen on zole monitor, in which pt was shocked twice, no shockable rhythm seen and after multiple rounds of medications/efforts performed, TOD was called at 8:26AM. Case was discussed with  at bedside, empathy provided. Medical examiner contacted case accepted. Case # [id number]. 68 y/o F with pmhx of GERD who presents to the ED c/o sore throat, mainly left sided x 1 week. ?inciting food ingestion injury. went to urgent care started on amoxicillin, no improvement and came to ED. while in ED, CT performed revealing " Findings compatible with bilateral palatine tonsillitis with a left peritonsillar abscess as detailed above. There is reactive cervical lymphadenopathy. 2 mm posteriorly projecting aneurysm about the left M1 segment. Fusiform aneurysmal dilation of the right carotid terminus measuring up to 7 mm." PTA I&D by ED team. Pt placed in CDU for IV abx and decadron. Neurosurg also consulted for incidental finding of two aneurysms as noted above, with no acute intervention necessitated at this time. Pt feeling well, tolerating PO, had some intermittent epigastric pain/reflux  and some intermittent vomiting which had improved with zofran/reglan. As per RN Gillian, states she had fed patient breakfast this AM, had a transient episode of abdominal pain that resolved and when nurse had checked on her, was found to be agonal breathing, pulseless, code blue initiated by CDU/ED team. CPR initiated right away, airway secured with tracheal intubation, initially PEA, then epi initiated, and after several pulses checks/PEA, ?Vfib seen on zole monitor, in which pt was shocked twice, no shockable rhythm seen and after multiple rounds of medications/efforts performed, BLANCA was called at 8:26AM. Case was discussed with  at bedside, empathy provided. Medical examiner contacted Case # [id number], they called back and indicated they will not take the case. family notified.

## 2024-12-01 NOTE — ED PROCEDURE NOTE - PROCEDURE ADDITIONAL DETAILS
Peripheral IV access in the Emergency Department obtained under dynamic ultrasound guidance with dark nonpulsatile blood return.  Catheter was flushed afterwards without any resistance or resulting extravasation.  IV catheter confirmed in compressible vein after insertion.
L PTA I&D obtained 2cc purulent/bloody fluid. sent culture

## 2024-12-01 NOTE — CONSULT NOTE ADULT - SUBJECTIVE AND OBJECTIVE BOX
Patient is a 69y old  Female who presents with a chief complaint of     HPI:      PAST MEDICAL & SURGICAL HISTORY:  H/O bilateral mastectomy  w/ reconstruction      Vital Signs Last 24 Hrs  T(C): 37.6 (30 Nov 2024 22:53), Max: 37.6 (30 Nov 2024 22:53)  T(F): 99.6 (30 Nov 2024 22:53), Max: 99.6 (30 Nov 2024 22:53)  HR: 81 (30 Nov 2024 22:53) (77 - 102)  BP: 142/72 (30 Nov 2024 22:53) (138/89 - 173/95)  BP(mean): --  RR: 19 (30 Nov 2024 22:53) (18 - 19)  SpO2: 97% (30 Nov 2024 22:53) (95% - 100%)    Parameters below as of 30 Nov 2024 22:53  Patient On (Oxygen Delivery Method): room air        PHYSICAL EXAM:  Constitutional: well developed, well nourished, NAD  ENMT: normal facies, symmetric  Neck: supple.   Respiratory:  unlabored breathing   Cardiovascular: RRR  Extremities: FROM, warm              LABS:                        14.0   9.57  )-----------( 329      ( 30 Nov 2024 12:29 )             44.2     11-30    143  |  105  |  13  ----------------------------<  107[H]  4.7   |  23  |  0.57    Ca    9.7      30 Nov 2024 12:29    TPro  8.4[H]  /  Alb  4.4  /  TBili  0.7  /  DBili  x   /  AST  21  /  ALT  27  /  AlkPhos  110  11-30    PT/INR - ( 30 Nov 2024 12:29 )   PT: 12.3 sec;   INR: 1.03 ratio         PTT - ( 30 Nov 2024 12:29 )  PTT:28.7 sec  Urinalysis Basic - ( 30 Nov 2024 12:29 )    Color: x / Appearance: x / SG: x / pH: x  Gluc: 107 mg/dL / Ketone: x  / Bili: x / Urobili: x   Blood: x / Protein: x / Nitrite: x   Leuk Esterase: x / RBC: x / WBC x   Sq Epi: x / Non Sq Epi: x / Bacteria: x        RADIOLOGY & ADDITIONAL STUDIES: Patient is a 69y old  Female who presents with a chief complaint of     HPI:       PAST MEDICAL & SURGICAL HISTORY:  H/O bilateral mastectomy  w/ reconstruction      Vital Signs Last 24 Hrs  T(C): 37.6 (30 Nov 2024 22:53), Max: 37.6 (30 Nov 2024 22:53)  T(F): 99.6 (30 Nov 2024 22:53), Max: 99.6 (30 Nov 2024 22:53)  HR: 81 (30 Nov 2024 22:53) (77 - 102)  BP: 142/72 (30 Nov 2024 22:53) (138/89 - 173/95)  BP(mean): --  RR: 19 (30 Nov 2024 22:53) (18 - 19)  SpO2: 97% (30 Nov 2024 22:53) (95% - 100%)    Parameters below as of 30 Nov 2024 22:53  Patient On (Oxygen Delivery Method): room air        PHYSICAL EXAM:  Constitutional: well developed, well nourished, NAD  ENMT: normal facies, symmetric  Neck: supple.   Respiratory:  unlabored breathing   Cardiovascular: RRR  Extremities: FROM, warm              LABS:                        14.0   9.57  )-----------( 329      ( 30 Nov 2024 12:29 )             44.2     11-30    143  |  105  |  13  ----------------------------<  107[H]  4.7   |  23  |  0.57    Ca    9.7      30 Nov 2024 12:29    TPro  8.4[H]  /  Alb  4.4  /  TBili  0.7  /  DBili  x   /  AST  21  /  ALT  27  /  AlkPhos  110  11-30    PT/INR - ( 30 Nov 2024 12:29 )   PT: 12.3 sec;   INR: 1.03 ratio         PTT - ( 30 Nov 2024 12:29 )  PTT:28.7 sec  Urinalysis Basic - ( 30 Nov 2024 12:29 )    Color: x / Appearance: x / SG: x / pH: x  Gluc: 107 mg/dL / Ketone: x  / Bili: x / Urobili: x   Blood: x / Protein: x / Nitrite: x   Leuk Esterase: x / RBC: x / WBC x   Sq Epi: x / Non Sq Epi: x / Bacteria: x        RADIOLOGY & ADDITIONAL STUDIES:

## 2024-12-01 NOTE — ED PROCEDURE NOTE - CPROC ED PHYSICIAN PRESENCE1
I was present during the key portion of the procedure.
parents verbalized understanding with no questions or concerns for RN @ time of d/c

## 2024-12-01 NOTE — CONSULT NOTE ADULT - ASSESSMENT
69-year-old female with no reported PMHx, PSHx bilateral mastectomy with reconstruction, Not on ac/ap, presents to the ED for evaluation of 1 week onset worsening left-sided throat swelling. Found to have a peritonsillar abscess for which I&D was done in the ED. Exam neurointact.   CTA is significant for a 2 mm posteriorly projecting aneurysm about the left M1 segment. Fusiform aneurysmal dilation of the right carotid terminus measuring up to 7 mm.

## 2024-12-01 NOTE — ED CDU PROVIDER SUBSEQUENT DAY NOTE - PHYSICAL EXAMINATION
patient is nontoxic, well-appearing, speaking in full sentences with clear voice.    EOMI, conjunctiva/sclera clear, PERRLA, no nystagmus.  No palpable sinus tenderness.  Oropharynx erythematous with left tonsillar prominence and no uvular deviation at this time, no exudates appreciated.  Airway patent.  No anterior neck swelling.  Maintaining secretions.    Minimal palpable lymphadenopathy within the cervical region, neck with FROM. No anterior neck swelling.  CV  normal rate, RRR, no MGR appreciated.    Lungs CTAB.    Moving all extremities, walking in room, no neurological deficits.    No visible bruising or rashes.    Mood and affect congruent.

## 2024-12-01 NOTE — PROVIDER CONTACT NOTE (OTHER) - ASSESSMENT
Medical team referred this case as patient   and family need bereavement.  Pt is 70 y/o female. Medical team met with the pts family member and explained the cause of death. Writer met with the   Joe Penaloza [phone]  and other family members in CDU.  Writer provided pts family members with emotional and supportive counseling to cope with this loss. Writer provided with education about the  procedure to the family members.  Pts   Joe Penaloza [phone]  and family members were given business card of this facility so  home could coordinate with the process.   and family verbalized understanding and  said he has a  home in mind. Writer made medical team aware of this intervention. no further SW intervention needed for this visit.

## 2024-12-01 NOTE — CONSULT NOTE ADULT - PROBLEM SELECTOR RECOMMENDATION 9
Outpt fu with Dr Castro and Yojana in 1 week for possible stenting of 7mm aneurism.     case to be discussed with attending - No acute neurosurgical intervention at this time.   - Outpt fu with Dr Castro and Yojana in 1-2 weeks    case discussed with attending

## 2024-12-01 NOTE — RAPID RESPONSE TEAM SUMMARY - NSSITUATIONBACKGROUNDRRT_GEN_ALL_CORE
69F w/ reportedly no pmh, pw throat swelling, fth new brain aneurysms. Rapid called overhead. On arrival, code blue already in-progress run by ED team. Code blue called overhead. Informed that patient is not admitted, so falls under ED care. ED noted to have full code team. MAR spoke to ED code leader regarding whether further assistance needed, but code leader declined. Rapid ended, rest of care per ED team

## 2024-12-01 NOTE — ED PROCEDURE NOTE - CPROC ED INFORMED CONSENT1
Refill request for abilify. Last visit was 11/6  Last ordered 8/11/23 #90 with 1 refill     Please advise. Thank you.   
This was an emergent procedure and consent was implied.
Benefits, risks, and possible complications of procedure explained to patient/caregiver who verbalized understanding and gave written consent.

## 2024-12-01 NOTE — ED PROCEDURE NOTE - ATTENDING CONTRIBUTION TO CARE
Dr. Person: I personally supervised this procedure performed by the resident and I agree with the above documentation.
I was present for the entirety of the procedure. I assisted the proceduralist as needed.

## 2024-12-01 NOTE — ED CDU PROVIDER SUBSEQUENT DAY NOTE - CLINICAL SUMMARY MEDICAL DECISION MAKING FREE TEXT BOX
68 y/o F with pmhx of GERD who presents to the ED c/o sore throat, mainly left sided x 1 week. Pt states that she swallowed her food a week ago and felt something sharp against the left side of her throat which precipitated pain. Pt went to urgent care, was given amoxicillin without relief of sx. Denies any other associated nasal congestion, pain with extraocular movement, throat swelling SOB, chest pain, lightheadedness, dizziness.  Denies any known sick exposure or known inciting events.  Denies any history of prior.  Denies any history of head and neck surgery.   In ED, no leukocytosis (WBC 10.14), all other labs within normal limits. CT with "1.  Findings compatible with bilateral palatine tonsillitis with a left   peritonsillar abscess as detailed above. There is reactive cervical   lymphadenopathy.  2.  There is a 2 mm posteriorly projecting aneurysm about the left M1   segment. Fusiform aneurysmal dilation of the right carotid terminus   measuring up to 7 mm." PTA I&D by ED team. Pt placed in CDU for IV abx and decadron. Neurosurg also consulted for incidental finding of two aneurysms as noted above.

## 2024-12-01 NOTE — ED POST DISCHARGE NOTE - REASON FOR FOLLOW-UP
Patient's spouse, Joe Puga, returned to ED with family after they had spoken to ME and [facility] Autopsy. ME had declined performing autopsy, but spouse requests physician autopsy. I completed the Autopsy request form with him at 4:30pm. I then sent the Autopsy request form to [email or web address], gave a photocopy to the spouse, and provided the original signed copy to [facility] Admitting Office. Other

## 2024-12-01 NOTE — ED CDU PROVIDER SUBSEQUENT DAY NOTE - NS ED ATTENDING STATEMENT MOD
PT: Rayna GAMA Crouch  DATE: 5/23/2023    Chief Complaint   Patient presents with   • Annual Exam     Fu on paxil - per pt haven't noticed big difference,pt is fasting for blood work       HPI  Patient is here today for preventive cpx.     Patient lives at home with boyfriend. States she feels safe at home and in relationship    Has noticed improvement with allergy symptoms/dizziness/floating sensation with anthistamine, flonase, and astelin nasal spray    Exercise Habits: none    Use of supplements: none    Medical Complaints: States she is doing well on paxil 20 mg, states she has not had panic attack since being on medication and states she feels more aware/not as sleepy on medication than zoloft. States she still feels anxious at times and would like to increase medication. Denies SI/HI.     Past Medical History:   Diagnosis Date   • Anxiety    • Asthma    • Depressive disorder    • RAD (reactive airway disease)      Outpatient Medications Marked as Taking for the 5/23/23 encounter (Office Visit) with Kimberly Ruiz CNP   Medication Sig Dispense Refill   • cetirizine-pseudoephedrine (ZyrTEC-D) 5-120 MG per tablet Take 1 tablet by mouth daily.     • MELATONIN PO Take 5 mg by mouth as needed.     • fluticasone (FLONASE) 50 MCG/ACT nasal spray Spray 2 sprays in each nostril daily. 16 g 3   • azelastine (ASTELIN) 0.1 % nasal spray Spray 1 spray in each nostril in the morning and 1 spray in the evening. Use in each nostril as directed 30 mL 1   • drospirenone-ethinyl estradiol (RAFFY) 3-0.02 MG per tablet Take 1 tablet by mouth daily.     • albuterol (ProAir RespiClick) 108 (90 Base) MCG/ACT inhaler ProAir RespiClick 90 mcg/actuation breath activated   INHALE 2 PUFFS PO Q 4 TO 6 HOURS PRN        Past Surgical History:   Procedure Laterality Date   • No past surgeries        Family History   Problem Relation Age of Onset   • Asthma Mother    • Depression Mother    • Anxiety disorder Mother    • Osteoarthritis  Mother    • Cancer, Esophageal Mother    • Alcohol Abuse Maternal Grandmother    • Substance Abuse Maternal Grandmother    • Alcohol Abuse Maternal Grandfather    • Substance Abuse Maternal Grandfather    • Depression Paternal Grandmother    • Cancer, Breast Paternal Grandmother      ALLERGIES:  Patient has no known allergies.     Review of Systems   Psychiatric/Behavioral: Negative for agitation, behavioral problems, confusion, decreased concentration, dysphoric mood, hallucinations, self-injury, sleep disturbance and suicidal ideas. The patient is nervous/anxious. The patient is not hyperactive.    All other systems reviewed and are negative.       Physical Exam  Vitals reviewed.   HENT:      Right Ear: Tympanic membrane, ear canal and external ear normal. Tympanic membrane is not erythematous or bulging.      Left Ear: Tympanic membrane, ear canal and external ear normal. Tympanic membrane is not erythematous or bulging.      Nose: Nose normal. No congestion or rhinorrhea.      Right Sinus: No maxillary sinus tenderness or frontal sinus tenderness.      Left Sinus: No maxillary sinus tenderness or frontal sinus tenderness.      Mouth/Throat:      Lips: Pink.      Mouth: Mucous membranes are moist.      Pharynx: Oropharynx is clear. No oropharyngeal exudate or posterior oropharyngeal erythema.      Tonsils: No tonsillar exudate or tonsillar abscesses. 0 on the right. 0 on the left.      Neck: Normal range of motion. No rigidity.   Eyes:      General: Lids are normal.         Right eye: No discharge.         Left eye: No discharge.      Extraocular Movements: Extraocular movements intact.      Conjunctiva/sclera: Conjunctivae normal.      Right eye: No exudate.     Left eye: No exudate.     Pupils: Pupils are equal, round, and reactive to light.   Neck:      Thyroid: No thyroid mass, thyromegaly or thyroid tenderness.   Cardiovascular:      Rate and Rhythm: Normal rate and regular rhythm.      Pulses: Normal  pulses.      Heart sounds: Normal heart sounds. No murmur heard.  Pulmonary:      Effort: Pulmonary effort is normal. No tachypnea, accessory muscle usage or respiratory distress.      Breath sounds: Normal breath sounds. No decreased breath sounds, wheezing, rhonchi or rales.   Chest:      Comments: deferred  Abdominal:      General: Bowel sounds are normal.      Palpations: Abdomen is soft.      Tenderness: There is no abdominal tenderness.   Musculoskeletal:      Right lower leg: No edema.      Left lower leg: No edema.   Lymphadenopathy:      Cervical: No cervical adenopathy.   Skin:     General: Skin is warm and dry.      Capillary Refill: Capillary refill takes less than 2 seconds.      Coloration: Skin is not pale.      Findings: No rash.   Neurological:      General: No focal deficit present.      Mental Status: She is alert and oriented to person, place, and time.      Cranial Nerves: Cranial nerves 2-12 are intact.      Sensory: Sensation is intact.      Motor: Motor function is intact.      Coordination: Coordination is intact.      Gait: Gait is intact.   Psychiatric:         Attention and Perception: Attention and perception normal.         Mood and Affect: Mood and affect normal. Mood is not anxious or depressed. Affect is not tearful.         Speech: Speech normal.         Behavior: Behavior normal. Behavior is cooperative.         Thought Content: Thought content normal.         Cognition and Memory: Cognition and memory normal.         Judgment: Judgment normal.       Visit Vitals  /68   Pulse 90   Temp 97.4 °F (36.3 °C) (Temporal)   Resp 16   Ht 5' 9\" (1.753 m)   Wt 69.8 kg (153 lb 14.4 oz)   LMP 05/09/2023 (Exact Date)   SpO2 98%   BMI 22.73 kg/m²     Recent PHQ 2/9 Score    PHQ 2:  Date Adult PHQ 2 Score Adult PHQ 2 Interpretation   4/28/2023 1 No further screening needed       PHQ 9:  Date Adult PHQ 9 Score Adult PHQ 9 Interpretation   4/28/2023 4 Minimal Depression         ASSESSMENT AND  PLAN  Diagnoses and all orders for this visit:  Annual physical exam  Laboratory examination ordered as part of a complete physical examination  -     CBC with Automated Differential  -     Comprehensive Metabolic Panel  -     Lipid Panel With Reflex  -     Thyroid Stimulating Hormone Reflex  Anxiety  -     PARoxetine (PAXIL) 40 MG tablet; Take 1 tablet by mouth daily.  History of major depression  -     PARoxetine (PAXIL) 40 MG tablet; Take 1 tablet by mouth daily.  Non-seasonal allergic rhinitis, unspecified trigger  Dizziness  Encounter for vaccination  -     TETANUS DIPHTHERIA ACELLULAR PERTUSSIS VACC, 10+ YRS (BOOSTRIX)    Reviewed appropriate diet and exercise regimen, including use of calcium and vitamin D supplements.  Counseled regarding use of sunscreens, car safety belts,  Reviewed safe sexual practices.    Patient states that dizziness symptoms and allergy symptoms have much improved with regimen of antihistamine, Astelin nasal spray and Flonase nasal spray.    [facility]. Patient was given referral to Dr. Katz last month to establish care for female health maintenance. States she has yet to call to establish care.     Discussed anxiety and depression, states that she is doing well on Paxil 20 mg but still having anxiety.  Discussed increasing to 40 mg daily and follow-up in office in 4 weeks.  Reeducated patient on medication, benefit versus risk and possible side effects.  Advised if any SI/HI to go to the ER immediately for further evaluation.    Advised if any questions or concerns to call the office for sooner reevaluation.  Patient verbalized understanding agreed to plan of care.    Health Maintenance Due   Topic Date Due   • DTaP/Tdap/Td Vaccine (5 - Tdap) 02/01/2007      There are no preventive care reminders to display for this patient.     Return in about 4 weeks (around 6/20/2023).     Kimberly Ruiz, CNP     This was a shared visit with the JUSTIN. I reviewed and verified the documentation.

## 2024-12-01 NOTE — ED CDU PROVIDER SUBSEQUENT DAY NOTE - HISTORY
In interim, VSS. Pt with two episodes of vomiting after getting medication for GERD, improved with Zofran and Reglan. Voice improved and more clear. On exam, oropharynx with some erythema and enlargement of left tonsil. No dysphagia or odynophagia. Neurosurg evaluated pt, no acute recommendations at this time, pt to f/u as outpatient. Will continue to monitor, IV abx, steroids and PO challenge.

## 2024-12-01 NOTE — CONSULT NOTE ADULT - PROVIDER SPECIALTY LIST ADULT
Vascular Surgery
Neurosurgery
Comment: Upper abdomen
Detail Level: Detailed
Comment: Lesions of concern.
Detail Level: Simple
Render Risk Assessment In Note?: yes

## 2024-12-02 LAB
CULTURE RESULTS: ABNORMAL
GRAM STN FLD: ABNORMAL
SPECIMEN SOURCE: SIGNIFICANT CHANGE UP